# Patient Record
Sex: FEMALE | URBAN - METROPOLITAN AREA
[De-identification: names, ages, dates, MRNs, and addresses within clinical notes are randomized per-mention and may not be internally consistent; named-entity substitution may affect disease eponyms.]

---

## 2024-10-29 PROCEDURE — RXMED WILLOW AMBULATORY MEDICATION CHARGE

## 2024-11-05 ENCOUNTER — PHARMACY VISIT (OUTPATIENT)
Dept: PHARMACY | Facility: CLINIC | Age: 19
End: 2024-11-05
Payer: MEDICARE

## 2024-11-08 ENCOUNTER — HOSPITAL ENCOUNTER (EMERGENCY)
Facility: HOSPITAL | Age: 19
Discharge: HOME | End: 2024-11-08

## 2024-11-08 VITALS
RESPIRATION RATE: 18 BRPM | HEART RATE: 86 BPM | OXYGEN SATURATION: 95 % | BODY MASS INDEX: 20.62 KG/M2 | DIASTOLIC BLOOD PRESSURE: 79 MMHG | WEIGHT: 105 LBS | SYSTOLIC BLOOD PRESSURE: 121 MMHG | TEMPERATURE: 96.8 F | HEIGHT: 60 IN

## 2024-11-08 PROCEDURE — 4500999001 HC ED NO CHARGE

## 2024-11-08 ASSESSMENT — LIFESTYLE VARIABLES
TOTAL SCORE: 0
HAVE YOU EVER FELT YOU SHOULD CUT DOWN ON YOUR DRINKING: NO
HAVE PEOPLE ANNOYED YOU BY CRITICIZING YOUR DRINKING: NO
EVER HAD A DRINK FIRST THING IN THE MORNING TO STEADY YOUR NERVES TO GET RID OF A HANGOVER: NO
EVER FELT BAD OR GUILTY ABOUT YOUR DRINKING: NO

## 2024-11-08 ASSESSMENT — PAIN DESCRIPTION - LOCATION: LOCATION: BACK

## 2024-11-08 ASSESSMENT — COLUMBIA-SUICIDE SEVERITY RATING SCALE - C-SSRS
2. HAVE YOU ACTUALLY HAD ANY THOUGHTS OF KILLING YOURSELF?: NO
1. IN THE PAST MONTH, HAVE YOU WISHED YOU WERE DEAD OR WISHED YOU COULD GO TO SLEEP AND NOT WAKE UP?: NO
6. HAVE YOU EVER DONE ANYTHING, STARTED TO DO ANYTHING, OR PREPARED TO DO ANYTHING TO END YOUR LIFE?: NO

## 2024-11-08 ASSESSMENT — PAIN DESCRIPTION - PROGRESSION: CLINICAL_PROGRESSION: NOT CHANGED

## 2024-11-08 ASSESSMENT — PAIN - FUNCTIONAL ASSESSMENT: PAIN_FUNCTIONAL_ASSESSMENT: 0-10

## 2024-11-08 ASSESSMENT — PAIN SCALES - GENERAL: PAINLEVEL_OUTOF10: 9

## 2024-11-08 NOTE — ED TRIAGE NOTES
Enters ED reporting mid-back pain that began today after a massage rated 9/10. Unrelieved by  mg Motrin. Denies neuropathy. Steady gait.

## 2024-12-13 ENCOUNTER — PHARMACY VISIT (OUTPATIENT)
Dept: PHARMACY | Facility: CLINIC | Age: 19
End: 2024-12-13
Payer: MEDICARE

## 2024-12-13 PROCEDURE — RXMED WILLOW AMBULATORY MEDICATION CHARGE

## 2025-04-01 PROCEDURE — RXMED WILLOW AMBULATORY MEDICATION CHARGE

## 2025-04-04 ENCOUNTER — PHARMACY VISIT (OUTPATIENT)
Dept: PHARMACY | Facility: CLINIC | Age: 20
End: 2025-04-04
Payer: MEDICARE

## 2025-04-29 PROCEDURE — RXMED WILLOW AMBULATORY MEDICATION CHARGE

## 2025-05-03 ENCOUNTER — PHARMACY VISIT (OUTPATIENT)
Dept: PHARMACY | Facility: CLINIC | Age: 20
End: 2025-05-03
Payer: MEDICARE

## 2025-05-05 ENCOUNTER — APPOINTMENT (OUTPATIENT)
Dept: RADIOLOGY | Facility: HOSPITAL | Age: 20
End: 2025-05-05
Payer: COMMERCIAL

## 2025-05-05 ENCOUNTER — HOSPITAL ENCOUNTER (EMERGENCY)
Facility: HOSPITAL | Age: 20
Discharge: HOME | End: 2025-05-06
Attending: EMERGENCY MEDICINE
Payer: COMMERCIAL

## 2025-05-05 VITALS
BODY MASS INDEX: 20.62 KG/M2 | WEIGHT: 105 LBS | OXYGEN SATURATION: 98 % | HEART RATE: 60 BPM | SYSTOLIC BLOOD PRESSURE: 115 MMHG | TEMPERATURE: 98.2 F | HEIGHT: 60 IN | RESPIRATION RATE: 16 BRPM | DIASTOLIC BLOOD PRESSURE: 75 MMHG

## 2025-05-05 DIAGNOSIS — R10.31 RIGHT LOWER QUADRANT ABDOMINAL PAIN: Primary | ICD-10-CM

## 2025-05-05 LAB
ABO GROUP (TYPE) IN BLOOD: NORMAL
ALBUMIN SERPL BCP-MCNC: 4.2 G/DL (ref 3.4–5)
ALP SERPL-CCNC: 59 U/L (ref 33–110)
ALT SERPL W P-5'-P-CCNC: 13 U/L (ref 7–45)
ANION GAP SERPL CALC-SCNC: 13 MMOL/L (ref 10–20)
ANTIBODY SCREEN: NORMAL
APPEARANCE UR: CLEAR
APTT PPP: 37 SECONDS (ref 26–36)
AST SERPL W P-5'-P-CCNC: 17 U/L (ref 9–39)
B-HCG SERPL-ACNC: <3 MIU/ML
BASOPHILS # BLD AUTO: 0.05 X10*3/UL (ref 0–0.1)
BASOPHILS NFR BLD AUTO: 0.6 %
BILIRUB SERPL-MCNC: 0.3 MG/DL (ref 0–1.2)
BILIRUB UR STRIP.AUTO-MCNC: NEGATIVE MG/DL
BUN SERPL-MCNC: 13 MG/DL (ref 6–23)
CALCIUM SERPL-MCNC: 9.5 MG/DL (ref 8.6–10.6)
CHLORIDE SERPL-SCNC: 99 MMOL/L (ref 98–107)
CLUE CELLS SPEC QL WET PREP: NORMAL
CO2 SERPL-SCNC: 28 MMOL/L (ref 21–32)
COLOR UR: NORMAL
CREAT SERPL-MCNC: 0.55 MG/DL (ref 0.5–1.05)
EGFRCR SERPLBLD CKD-EPI 2021: >90 ML/MIN/1.73M*2
EOSINOPHIL # BLD AUTO: 0.17 X10*3/UL (ref 0–0.7)
EOSINOPHIL NFR BLD AUTO: 1.9 %
ERYTHROCYTE [DISTWIDTH] IN BLOOD BY AUTOMATED COUNT: 11.2 % (ref 11.5–14.5)
GLUCOSE SERPL-MCNC: 115 MG/DL (ref 74–99)
GLUCOSE UR STRIP.AUTO-MCNC: NORMAL MG/DL
HCT VFR BLD AUTO: 40.7 % (ref 36–46)
HGB BLD-MCNC: 13.9 G/DL (ref 12–16)
IMM GRANULOCYTES # BLD AUTO: 0.11 X10*3/UL (ref 0–0.7)
IMM GRANULOCYTES NFR BLD AUTO: 1.2 % (ref 0–0.9)
INR PPP: 0.9 (ref 0.9–1.1)
KETONES UR STRIP.AUTO-MCNC: NEGATIVE MG/DL
LACTATE SERPL-SCNC: 1.5 MMOL/L (ref 0.4–2)
LEUKOCYTE ESTERASE UR QL STRIP.AUTO: NEGATIVE
LIPASE SERPL-CCNC: 39 U/L (ref 9–82)
LYMPHOCYTES # BLD AUTO: 1.9 X10*3/UL (ref 1.2–4.8)
LYMPHOCYTES NFR BLD AUTO: 20.9 %
MCH RBC QN AUTO: 28.6 PG (ref 26–34)
MCHC RBC AUTO-ENTMCNC: 34.2 G/DL (ref 32–36)
MCV RBC AUTO: 84 FL (ref 80–100)
MONOCYTES # BLD AUTO: 0.46 X10*3/UL (ref 0.1–1)
MONOCYTES NFR BLD AUTO: 5.1 %
MUCOUS THREADS #/AREA URNS AUTO: NORMAL /LPF
NEUTROPHILS # BLD AUTO: 6.39 X10*3/UL (ref 1.2–7.7)
NEUTROPHILS NFR BLD AUTO: 70.3 %
NITRITE UR QL STRIP.AUTO: NEGATIVE
NRBC BLD-RTO: 0 /100 WBCS (ref 0–0)
PH UR STRIP.AUTO: 6.5 [PH]
PLATELET # BLD AUTO: 366 X10*3/UL (ref 150–450)
POTASSIUM SERPL-SCNC: 4 MMOL/L (ref 3.5–5.3)
PROT SERPL-MCNC: 8.1 G/DL (ref 6.4–8.2)
PROT UR STRIP.AUTO-MCNC: NORMAL MG/DL
PROTHROMBIN TIME: 9.9 SECONDS (ref 9.8–12.4)
RBC # BLD AUTO: 4.86 X10*6/UL (ref 4–5.2)
RBC # UR STRIP.AUTO: NEGATIVE MG/DL
RBC #/AREA URNS AUTO: NORMAL /HPF
RH FACTOR (ANTIGEN D): NORMAL
SODIUM SERPL-SCNC: 136 MMOL/L (ref 136–145)
SP GR UR STRIP.AUTO: 1.03
SQUAMOUS #/AREA URNS AUTO: NORMAL /HPF
T VAGINALIS SPEC QL WET PREP: NORMAL
TRICHOMONAS REFLEX COMMENT: NORMAL
UROBILINOGEN UR STRIP.AUTO-MCNC: NORMAL MG/DL
WBC # BLD AUTO: 9.1 X10*3/UL (ref 4.4–11.3)
WBC #/AREA URNS AUTO: NORMAL /HPF
WBC VAG QL WET PREP: NORMAL
YEAST VAG QL WET PREP: NORMAL

## 2025-05-05 PROCEDURE — 76830 TRANSVAGINAL US NON-OB: CPT | Performed by: RADIOLOGY

## 2025-05-05 PROCEDURE — 86901 BLOOD TYPING SEROLOGIC RH(D): CPT | Performed by: PHYSICIAN ASSISTANT

## 2025-05-05 PROCEDURE — 87491 CHLMYD TRACH DNA AMP PROBE: CPT | Performed by: PHYSICIAN ASSISTANT

## 2025-05-05 PROCEDURE — 2500000004 HC RX 250 GENERAL PHARMACY W/ HCPCS (ALT 636 FOR OP/ED): Mod: JZ | Performed by: PHYSICIAN ASSISTANT

## 2025-05-05 PROCEDURE — 76856 US EXAM PELVIC COMPLETE: CPT

## 2025-05-05 PROCEDURE — 80053 COMPREHEN METABOLIC PANEL: CPT | Performed by: EMERGENCY MEDICINE

## 2025-05-05 PROCEDURE — 99285 EMERGENCY DEPT VISIT HI MDM: CPT | Mod: 25 | Performed by: EMERGENCY MEDICINE

## 2025-05-05 PROCEDURE — 74177 CT ABD & PELVIS W/CONTRAST: CPT

## 2025-05-05 PROCEDURE — 81001 URINALYSIS AUTO W/SCOPE: CPT | Performed by: EMERGENCY MEDICINE

## 2025-05-05 PROCEDURE — 85730 THROMBOPLASTIN TIME PARTIAL: CPT | Performed by: PHYSICIAN ASSISTANT

## 2025-05-05 PROCEDURE — 96361 HYDRATE IV INFUSION ADD-ON: CPT

## 2025-05-05 PROCEDURE — 83690 ASSAY OF LIPASE: CPT | Performed by: EMERGENCY MEDICINE

## 2025-05-05 PROCEDURE — 2500000004 HC RX 250 GENERAL PHARMACY W/ HCPCS (ALT 636 FOR OP/ED): Mod: JZ

## 2025-05-05 PROCEDURE — 87210 SMEAR WET MOUNT SALINE/INK: CPT | Performed by: PHYSICIAN ASSISTANT

## 2025-05-05 PROCEDURE — 85025 COMPLETE CBC W/AUTO DIFF WBC: CPT | Performed by: EMERGENCY MEDICINE

## 2025-05-05 PROCEDURE — 74177 CT ABD & PELVIS W/CONTRAST: CPT | Mod: FOREIGN READ | Performed by: RADIOLOGY

## 2025-05-05 PROCEDURE — 83605 ASSAY OF LACTIC ACID: CPT | Performed by: PHYSICIAN ASSISTANT

## 2025-05-05 PROCEDURE — 96375 TX/PRO/DX INJ NEW DRUG ADDON: CPT

## 2025-05-05 PROCEDURE — 93975 VASCULAR STUDY: CPT

## 2025-05-05 PROCEDURE — 84702 CHORIONIC GONADOTROPIN TEST: CPT | Performed by: EMERGENCY MEDICINE

## 2025-05-05 PROCEDURE — 76856 US EXAM PELVIC COMPLETE: CPT | Performed by: RADIOLOGY

## 2025-05-05 PROCEDURE — 36415 COLL VENOUS BLD VENIPUNCTURE: CPT | Performed by: PHYSICIAN ASSISTANT

## 2025-05-05 PROCEDURE — 2550000001 HC RX 255 CONTRASTS: Performed by: EMERGENCY MEDICINE

## 2025-05-05 PROCEDURE — 96374 THER/PROPH/DIAG INJ IV PUSH: CPT | Mod: 59

## 2025-05-05 PROCEDURE — 87661 TRICHOMONAS VAGINALIS AMPLIF: CPT | Performed by: PHYSICIAN ASSISTANT

## 2025-05-05 RX ORDER — MORPHINE SULFATE 4 MG/ML
INJECTION INTRAVENOUS
Status: COMPLETED
Start: 2025-05-05 | End: 2025-05-05

## 2025-05-05 RX ORDER — ONDANSETRON HYDROCHLORIDE 2 MG/ML
INJECTION, SOLUTION INTRAVENOUS
Status: COMPLETED
Start: 2025-05-05 | End: 2025-05-05

## 2025-05-05 RX ORDER — MORPHINE SULFATE 4 MG/ML
4 INJECTION INTRAVENOUS ONCE
Status: COMPLETED | OUTPATIENT
Start: 2025-05-05 | End: 2025-05-05

## 2025-05-05 RX ORDER — ONDANSETRON HYDROCHLORIDE 2 MG/ML
4 INJECTION, SOLUTION INTRAVENOUS ONCE
Status: COMPLETED | OUTPATIENT
Start: 2025-05-05 | End: 2025-05-05

## 2025-05-05 RX ADMIN — MORPHINE SULFATE 4 MG: 4 INJECTION, SOLUTION INTRAMUSCULAR; INTRAVENOUS at 19:08

## 2025-05-05 RX ADMIN — MORPHINE SULFATE 4 MG: 4 INJECTION INTRAVENOUS at 19:08

## 2025-05-05 RX ADMIN — SODIUM CHLORIDE, SODIUM LACTATE, POTASSIUM CHLORIDE, AND CALCIUM CHLORIDE 1000 ML: .6; .31; .03; .02 INJECTION, SOLUTION INTRAVENOUS at 19:09

## 2025-05-05 RX ADMIN — IOHEXOL 75 ML: 350 INJECTION, SOLUTION INTRAVENOUS at 20:15

## 2025-05-05 RX ADMIN — ONDANSETRON 4 MG: 2 INJECTION INTRAMUSCULAR; INTRAVENOUS at 19:09

## 2025-05-05 RX ADMIN — ONDANSETRON HYDROCHLORIDE 4 MG: 2 INJECTION, SOLUTION INTRAVENOUS at 19:09

## 2025-05-05 ASSESSMENT — PAIN SCALES - GENERAL: PAINLEVEL_OUTOF10: 0 - NO PAIN

## 2025-05-05 ASSESSMENT — PAIN DESCRIPTION - LOCATION: LOCATION: ABDOMEN

## 2025-05-05 ASSESSMENT — PAIN DESCRIPTION - DESCRIPTORS: DESCRIPTORS: STABBING;CRAMPING

## 2025-05-05 ASSESSMENT — PAIN - FUNCTIONAL ASSESSMENT: PAIN_FUNCTIONAL_ASSESSMENT: 0-10

## 2025-05-05 NOTE — ED TRIAGE NOTES
Pt presents with abdominal pain since 1630 accompanied by lower back pain with nausea. Pt is on birth control .

## 2025-05-05 NOTE — ED PROVIDER NOTES
Emergency Department Encounter  [facility] EMERGENCY MEDICINE    Patient: Vamshi Adams  MRN: [id number]  : 2005  Date of Evaluation: 2025  ED Provider: Lorenza Waite PA-C        History of Present Illness     This is a 19-year-old healthy female who presents to the ED with sudden onset right lower quadrant abdominal pain that began around 430 this afternoon.  She states the pain is primarily in her right lower quadrant but is radiating throughout her abdomen.  Denies is ever happening previously.  Endorses associated nausea without vomiting.  Denies vaginal discharge or bleeding.  Denies urinary symptoms.  Denies constipation or diarrhea.  Denies is ever happening previously.  States otherwise she has been feeling well.               Visit Vitals  /89   Pulse 69   Temp 36.8 °C (98.2 °F) (Temporal)   Resp 17   Ht 1.524 m (5')   Wt 47.6 kg (105 lb)   SpO2 99%   BMI 20.51 kg/m²   Smoking Status Unknown   BSA 1.42 m²          Physical Exam       Triage vitals:  T 36.8 °C (98.2 °F)  HR 69  /89  RR 17  O2 99 % None (Room air)    Physical Exam     Physical exam:    General: Vitals noted, no distress. Afebrile.    EENT: PERRL, EOM's intact. Eye lids without lesions. No scleral icterus. Normal phonation. Nares patent. MMM.     Cardiac: Regular, rate, rhythm, no murmur.    Pulmonary: Lungs clear bilaterally with good aeration. No adventitious breath sounds.    Abdomen: Exquisitely tender to palpation to the right lower quadrant at McBurney's point.  Voluntary guarding of the area.  Positive Rovsing sign.  Mild tenderness palpation throughout the lower abdomen diffusely.  Soft, nonsurgical. No peritoneal signs. Normoactive bowel sounds. No CVA tenderness.     Pelvic Exam: Chaperone present. External genitalia normal. No rashes or lesions. Speculum exam shows no lesions on the cervix. No abnormal discharge. No bleeding. Closed cervical os. Bimanual exam shows right sided  adnexal pain without any palpable mass.  No cervical motion tenderness.    Extremities: No peripheral edema. Full range of motion. Moves all extremities freely.     Skin: No rash. Warm and dry. No discoloration noted.     Neuro: No focal neurologic deficits noted.  Pt is A&O x3, speech is clear, moves all extremities independently sensation is intact.          Results       Labs Reviewed   CBC WITH AUTO DIFFERENTIAL - Abnormal       Result Value    WBC 9.1      nRBC 0.0      RBC 4.86      Hemoglobin 13.9      Hematocrit 40.7      MCV 84      MCH 28.6      MCHC 34.2      RDW 11.2 (*)     Platelets 366      Neutrophils % 70.3      Immature Granulocytes %, Automated 1.2 (*)     Lymphocytes % 20.9      Monocytes % 5.1      Eosinophils % 1.9      Basophils % 0.6      Neutrophils Absolute 6.39      Immature Granulocytes Absolute, Automated 0.11      Lymphocytes Absolute 1.90      Monocytes Absolute 0.46      Eosinophils Absolute 0.17      Basophils Absolute 0.05     COMPREHENSIVE METABOLIC PANEL - Abnormal    Glucose 115 (*)     Sodium 136      Potassium 4.0      Chloride 99      Bicarbonate 28      Anion Gap 13      Urea Nitrogen 13      Creatinine 0.55      eGFR >90      Calcium 9.5      Albumin 4.2      Alkaline Phosphatase 59      Total Protein 8.1      AST 17      Bilirubin, Total 0.3      ALT 13     COAGULATION SCREEN - Abnormal    Protime 9.9      INR 0.9      aPTT 37 (*)     Narrative:     The APTT is no longer used for monitoring Unfractionated Heparin Therapy. For monitoring Heparin Therapy, use the Heparin Assay.   HUMAN CHORIONIC GONADOTROPIN, SERUM QUANTITATIVE - Normal    HCG, Beta-Quantitative <3      Narrative:     Total HCG measurement is performed using the Siemens [employer] immunoassay which detects intact HCG and free beta HCG subunit.  This test is not indicated for use as a tumor marker.  HCG testing is performed using a different test methodology at [facility] than other system  . Direct result comparison  should only be made within the same method.          LIPASE - Normal    Lipase 39      Narrative:     Venipuncture immediately after or during the administration of Metamizole may lead to falsely low results. Testing should be performed immediately prior to Metamizole dosing.   URINALYSIS WITH REFLEX CULTURE AND MICROSCOPIC - Normal    Color, Urine Light-Yellow      Appearance, Urine Clear      Specific Gravity, Urine 1.031      pH, Urine 6.5      Protein, Urine 10 (TRACE)      Glucose, Urine Normal      Blood, Urine NEGATIVE      Ketones, Urine NEGATIVE      Bilirubin, Urine NEGATIVE      Urobilinogen, Urine Normal      Nitrite, Urine NEGATIVE      Leukocyte Esterase, Urine NEGATIVE     LACTATE - Normal    Lactate 1.5      Narrative:     Venipuncture immediately after or during the administration of Metamizole may lead to falsely low results. Testing should be performed immediately prior to Metamizole dosing.   TRICHOMONAS WET PREP REFLEX TO PCR    Trichomonas None Seen      Clue Cells None Seen      Yeast None Seen      WBC 1-2      Trichomonas reflex comment        Value: Trichomonas was not seen by wet prep. Reflex Trichomonas vaginalis by Amplified Detection.   TYPE AND SCREEN    ABO TYPE B      Rh TYPE POS      ANTIBODY SCREEN NEG     URINALYSIS WITH REFLEX CULTURE AND MICROSCOPIC    Narrative:     The following orders were created for panel order Urinalysis with Reflex Culture and Microscopic.  Procedure                               Abnormality         Status                     ---------                               -----------         ------                     Urinalysis with Reflex C...[[id number]]  Normal              Final result               Extra Urine Gray Tube[[id number]]                            In process                   Please view results for these tests on the individual orders.   EXTRA URINE GRAY TUBE   C. TRACHOMATIS / N. GONORRHOEAE, AMPLIFIED,  UROGENITAL   TRICH VAGINALIS, AMPLIFIED   URINALYSIS MICROSCOPIC WITH REFLEX CULTURE    WBC, Urine 1-5      RBC, Urine 1-2      Squamous Epithelial Cells, Urine 1-9 (SPARSE)      Mucus, Urine 2+         US PELVIS TRANSABDOMINAL WITH TRANSVAGINAL   Final Result   Trace amount of free fluid at the right adnexa. Otherwise, no acute   findings.                       I personally reviewed the images/study and I agree with the findings   as stated.        MACRO:   None        Signed by: Hazel Bullock 5/5/2025 8:57 PM   Dictation workstation:   [id number]      CT abdomen pelvis w IV contrast    (Results Pending)         Medical Decision Making & ED Course         ED Course & MDM     Medical Decision Making  This is a 19-year-old healthy female who presents to the ED with sudden onset right lower quadrant abdominal pain with associated nausea that began around 430 this afternoon.  Vital stable upon arrival to the ED.  On examination she was exquisitely tender to palpation to her right lower quadrant.  Voluntary guarding in the area.  Positive Rovsing sign.  Pelvic examination performed which did show right-sided adnexal tenderness without palpable mass.  No cervical motion tenderness.  Laboratory studies obtained.  Transvaginal to sound ordered to evaluate for torsion as well as CT abdomen and pelvis.  Patient medicated with Zofran, morphine and 1 L of IV fluids.  She was discussed with the attending physician.  On reassessment she was feeling improved.  Laboratory studies overall grossly unremarkable.  Normal wet prep.  Normal UA.  Ultrasound showed no evidence of ovarian torsion.  There was a trace amount of free fluid in the right adnexal area.  Patient signed out to oncoming team pending CT abdomen pelvis and reassessment.                         Disposition   Patient was signed out to Tabitha Luis at 2200 pending completion of their work-up.  Please see the next provider's transition of care note for the  remainder of the patient's care.     Procedures     This was a shared visit with an ED attending.  The patient was seen and discussed with the ED attending    Procedures    Lorenza Waite PA-C  Emergency Medicine      Lorenza Waite PA-C  05/05/25 [id number]

## 2025-05-05 NOTE — Clinical Note
Vamshi Adams was seen and treated in our emergency department on 5/5/2025.  She may return to school on 05/08/2025.      If you have any questions or concerns, please don't hesitate to call.      Steffen Simerlink, MD

## 2025-05-06 ENCOUNTER — PHARMACY VISIT (OUTPATIENT)
Dept: PHARMACY | Facility: CLINIC | Age: 20
End: 2025-05-06
Payer: MEDICARE

## 2025-05-06 LAB
C TRACH RRNA SPEC QL NAA+PROBE: NEGATIVE
HOLD SPECIMEN: NORMAL
N GONORRHOEA DNA SPEC QL PROBE+SIG AMP: NEGATIVE
T VAGINALIS RRNA SPEC QL NAA+PROBE: NEGATIVE

## 2025-05-06 PROCEDURE — 2500000001 HC RX 250 WO HCPCS SELF ADMINISTERED DRUGS (ALT 637 FOR MEDICARE OP): Performed by: PHYSICIAN ASSISTANT

## 2025-05-06 PROCEDURE — RXMED WILLOW AMBULATORY MEDICATION CHARGE

## 2025-05-06 PROCEDURE — 2500000004 HC RX 250 GENERAL PHARMACY W/ HCPCS (ALT 636 FOR OP/ED): Performed by: PHYSICIAN ASSISTANT

## 2025-05-06 RX ORDER — OXYCODONE HYDROCHLORIDE 5 MG/1
5 TABLET ORAL EVERY 6 HOURS PRN
Qty: 8 TABLET | Refills: 0 | Status: SHIPPED | OUTPATIENT
Start: 2025-05-06 | End: 2025-05-08

## 2025-05-06 RX ORDER — ONDANSETRON 4 MG/1
4 TABLET, ORALLY DISINTEGRATING ORAL EVERY 8 HOURS PRN
Qty: 21 TABLET | Refills: 0 | Status: SHIPPED | OUTPATIENT
Start: 2025-05-06 | End: 2025-05-13

## 2025-05-06 RX ORDER — ONDANSETRON 4 MG/1
4 TABLET, ORALLY DISINTEGRATING ORAL ONCE AS NEEDED
Status: COMPLETED | OUTPATIENT
Start: 2025-05-06 | End: 2025-05-06

## 2025-05-06 RX ORDER — OXYCODONE HYDROCHLORIDE 5 MG/1
5 TABLET ORAL ONCE
Refills: 0 | Status: COMPLETED | OUTPATIENT
Start: 2025-05-06 | End: 2025-05-06

## 2025-05-06 RX ADMIN — OXYCODONE 5 MG: 5 TABLET ORAL at 02:09

## 2025-05-06 RX ADMIN — ONDANSETRON 4 MG: 4 TABLET, ORALLY DISINTEGRATING ORAL at 02:09

## 2025-05-06 ASSESSMENT — ENCOUNTER SYMPTOMS
CHEST TIGHTNESS: 0
NAUSEA: 0
WEAKNESS: 0
ABDOMINAL PAIN: 0
FEVER: 0
CHILLS: 0
APPETITE CHANGE: 0
HEMATURIA: 0
VOMITING: 0
DYSURIA: 0
LIGHT-HEADEDNESS: 0
CONSTIPATION: 0
DIARRHEA: 0
DIZZINESS: 0

## 2025-05-06 NOTE — CONSULTS
[facility]  ACUTE CARE SURGERY - HISTORY AND PHYSICAL / CONSULT    Patient Name: Vamshi Adams  MRN: [id number]  Admit Date: 505  : 2005  AGE: 19 y.o.   GENDER: female  ==============================================================================  TODAY'S ASSESSMENT AND PLAN OF CARE:  Vamshi Adams is a 19 y.o. 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 CT with distended loops of bowel but no obvious transition point. She is tolerating PO intake, has no nausea or emesis and is passing gas and having bowel movements. Making obstruction less likely.    Plan:  - recommend admission for 23 hr obs give abdominal pain  - if patient does not want to be admitted for obs, PO challenge and trail of ambulation prior to discharge  - if discharges, please discharge with return precautions in the event of recurrent abdominal pain    Patient discussed with Chief Resident Dr. Antonio Gtz, PGY-4 and Attending Physician Dr. Moore.    Umm Mcgraw MD PGY-2  Acute Care Surgery [id number]    ==============================================================================  CHIEF COMPLAINT/REASON FOR CONSULT:  Vamshi Adams is a 19 y.o. 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 Pain was sudden, sharp and located in the RLQ.  It has been constant.  She has never has anything similar to this previously.  No associated nausea, vomiting, fever, chills or chest pain.  She is passing gas and having bowel movements.  Since she was a child she has have some episodes where she feels bloated, has an episode of emesis and then feels better but this feels different than that.  She is not currently menstruating,  this pain feels different than her normal menstrual cramps.    In the emergency department she is afebrile, nontachycardic, normotensive, saturating well on room air.  No significant lab abnormalities.  CT of the abdomen and pelvis demonstrating distended small bowel loops with air fluid level and fecalization without evidence of transition point.  Ultrasound of the pelvis with small amount of free fluid in her right adnexa, bilateral fluid to the ovaries.  General surgery consulted for additional conditions.    PMH:   Medical History[1]  Last menstrual period: on birth control    PSH:   Surgical History[2]  FH:   Family History[3]  SOCIAL HISTORY:    Smoking:    Tobacco Use History[4]    Alcohol:    Social History     Substance and Sexual Activity   Alcohol Use None       Drug use: denies    MEDICATIONS:   Prior to Admission medications    Medication Sig Start Date End Date Taking? Authorizing Provider   drospirenone-ethinyl estradiol (Marisabel) 3-0.02 mg tablet Take 1 tablet by mouth daily 3/31/25      drospirenone-ethinyl estradiol (Marisabel) 3-0.02 mg tablet Take 1 tablet by mouth daily 4/29/25      drospirenone-ethinyl estradioL (Marisabel, Gianvi) 3-0.02 mg tablet Take 1 tablet by mouth daily 10/29/24      spironolactone (Aldactone) 25 mg tablet Take 75mg (three 25mg tablets) daily. Stop if pregnant. 10/29/24        ALLERGIES:   RX Allergies[5]    REVIEW OF SYSTEMS:  Review of Systems   Constitutional:  Negative for appetite change, chills and fever.   Respiratory:  Negative for chest tightness.    Cardiovascular:  Negative for chest pain.   Gastrointestinal:  Negative for abdominal pain, constipation, diarrhea, nausea and vomiting.   Genitourinary:  Negative for dysuria, hematuria and menstrual problem.   Neurological:  Negative for dizziness, weakness and light-headedness.     PHYSICAL EXAM:  Physical Exam  Constitutional:       General: She is not in acute distress.     Appearance: Normal appearance.   HENT:       Mouth/Throat:      Mouth: Mucous membranes are moist.      Pharynx: Oropharynx is clear.   Eyes:      General: No scleral icterus.     Conjunctiva/sclera: Conjunctivae normal.   Cardiovascular:      Rate and Rhythm: Normal rate and regular rhythm.   Pulmonary:      Effort: No respiratory distress.   Chest:      Chest wall: No tenderness.   Abdominal:      General: There is distension.      Tenderness: There is abdominal tenderness (mildly tender in the left abdomen). There is no guarding or rebound.      Comments: Well healed RUQ scar   Skin:     General: Skin is warm and dry.      Capillary Refill: Capillary refill takes less than 2 seconds.   Neurological:      General: No focal deficit present.      Mental Status: She is alert.   Psychiatric:         Mood and Affect: Mood normal.       IMAGING SUMMARY:    US PELVIS TRANSABDOMINAL WITH TRANSVAGINAL   Final Result   Trace amount of free fluid at the right adnexa. Otherwise, no acute   findings.                       I personally reviewed the images/study and I agree with the findings   as stated.        MACRO:   None        Signed by: Hazel Bullock 5/5/2025 8:57 PM   Dictation workstation:   [id number]      CT abdomen pelvis w IV contrast   Final Result   1. Mild wall thickening of multiple small bowel loops as well as   possible scattered colonic wall thickening, suggesting underlying   enterocolitis which may be infectious or inflammatory in etiology.   Additionally, multiple prominent caliber small bowel loops demonstrate   air-fluid levels and fecalization of intraluminal contents, raising   the possibility of a developing small bowel obstruction possibly due   to inflammatory stricture. Although, no discrete transition zone is   identified.   Signed by Edwin Bradford DO             LABS:  Results from last 7 days   Lab Units 05/05/25  1903   WBC AUTO x10*3/uL 9.1   HEMOGLOBIN g/dL 13.9   HEMATOCRIT % 40.7   PLATELETS AUTO x10*3/uL 366   NEUTROS PCT AUTO %  70.3   LYMPHS PCT AUTO % 20.9   MONOS PCT AUTO % 5.1   EOS PCT AUTO % 1.9     Results from last 7 days   Lab Units 05/05/25  1903   APTT seconds 37*   INR  0.9     Results from last 7 days   Lab Units 05/05/25  1903   SODIUM mmol/L 136   POTASSIUM mmol/L 4.0   CHLORIDE mmol/L 99   CO2 mmol/L 28   BUN mg/dL 13   CREATININE mg/dL 0.55   CALCIUM mg/dL 9.5   PROTEIN TOTAL g/dL 8.1   BILIRUBIN TOTAL mg/dL 0.3   ALK PHOS U/L 59   ALT U/L 13   AST U/L 17   GLUCOSE mg/dL 115*     Results from last 7 days   Lab Units 05/05/25  1903   BILIRUBIN TOTAL mg/dL 0.3             I have reviewed all laboratory and imaging results ordered/pertinent for this encounter.        [1] No past medical history on file.  [2] No past surgical history on file.  [3] No family history on file.  [4]   Social History  Tobacco Use   Smoking Status Unknown   Smokeless Tobacco Not on file   [5] No Known Allergies

## 2025-05-06 NOTE — DISCHARGE INSTRUCTIONS
medications at [facility] pharmacy here at [facility] - opens at 8AM  Follow up with your PCP and GI physician at home when you return Friday.

## 2025-05-06 NOTE — PROGRESS NOTES
Emergency Medicine Transition of Care Note.    I received Vamshi Adams in signout from ROX Waite.  Please see the previous ED provider note for all HPI, PE and MDM up to the time of signout at 2200. This is in addition to the primary record.    In brief Vamshi Adams is an 19 y.o. female presenting for   Chief Complaint   Patient presents with    Abdominal Pain     At the time of signout we were awaiting: CT final read    In brief, pt with h/o jejunal atresia s/p surgery as a . Presenting with RLQ pain, nausea, LBP since 1630. Labs benign. US without acute findings. CT c/f enterocolitis, possible developing SBO v. Inflammatory stricture.  ACS is consulted given h/o surgery as infant with questionable findings on CT. Pt resides in [place], here for school, plans to return home Friday if medically cleared and able - follows with GI in [place].  ACS recommends either obs for pain control v. PO chall and dispo home. Pt willing to attempt PO chall, given oral pain medication.   Able to tolerate oral intake. Agreeable with discharge plan - medications sent to [facility], can pick them up in AM  Staffed with supervising physician, Dr. Nettles, who agrees with workup and treatment plan.    Diagnostics     Labs Reviewed   CBC WITH AUTO DIFFERENTIAL - Abnormal       Result Value    WBC 9.1      nRBC 0.0      RBC 4.86      Hemoglobin 13.9      Hematocrit 40.7      MCV 84      MCH 28.6      MCHC 34.2      RDW 11.2 (*)     Platelets 366      Neutrophils % 70.3      Immature Granulocytes %, Automated 1.2 (*)     Lymphocytes % 20.9      Monocytes % 5.1      Eosinophils % 1.9      Basophils % 0.6      Neutrophils Absolute 6.39      Immature Granulocytes Absolute, Automated 0.11      Lymphocytes Absolute 1.90      Monocytes Absolute 0.46      Eosinophils Absolute 0.17      Basophils Absolute 0.05     COMPREHENSIVE METABOLIC PANEL - Abnormal    Glucose 115 (*)     Sodium 136      Potassium 4.0      Chloride 99       Bicarbonate 28      Anion Gap 13      Urea Nitrogen 13      Creatinine 0.55      eGFR >90      Calcium 9.5      Albumin 4.2      Alkaline Phosphatase 59      Total Protein 8.1      AST 17      Bilirubin, Total 0.3      ALT 13     COAGULATION SCREEN - Abnormal    Protime 9.9      INR 0.9      aPTT 37 (*)     Narrative:     The APTT is no longer used for monitoring Unfractionated Heparin Therapy. For monitoring Heparin Therapy, use the Heparin Assay.   HUMAN CHORIONIC GONADOTROPIN, SERUM QUANTITATIVE - Normal    HCG, Beta-Quantitative <3      Narrative:     Total HCG measurement is performed using the Siemens [employer] immunoassay which detects intact HCG and free beta HCG subunit.  This test is not indicated for use as a tumor marker.  HCG testing is performed using a different test methodology at [facility] than other [facility]. Direct result comparison  should only be made within the same method.          LIPASE - Normal    Lipase 39      Narrative:     Venipuncture immediately after or during the administration of Metamizole may lead to falsely low results. Testing should be performed immediately prior to Metamizole dosing.   URINALYSIS WITH REFLEX CULTURE AND MICROSCOPIC - Normal    Color, Urine Light-Yellow      Appearance, Urine Clear      Specific Gravity, Urine 1.031      pH, Urine 6.5      Protein, Urine 10 (TRACE)      Glucose, Urine Normal      Blood, Urine NEGATIVE      Ketones, Urine NEGATIVE      Bilirubin, Urine NEGATIVE      Urobilinogen, Urine Normal      Nitrite, Urine NEGATIVE      Leukocyte Esterase, Urine NEGATIVE     LACTATE - Normal    Lactate 1.5      Narrative:     Venipuncture immediately after or during the administration of Metamizole may lead to falsely low results. Testing should be performed immediately prior to Metamizole dosing.   TRICHOMONAS WET PREP REFLEX TO PCR    Trichomonas None Seen      Clue Cells None Seen      Yeast None Seen      WBC 1-2       Trichomonas reflex comment        Value: Trichomonas was not seen by wet prep. Reflex Trichomonas vaginalis by Amplified Detection.   TYPE AND SCREEN    ABO TYPE B      Rh TYPE POS      ANTIBODY SCREEN NEG     URINALYSIS WITH REFLEX CULTURE AND MICROSCOPIC    Narrative:     The following orders were created for panel order Urinalysis with Reflex Culture and Microscopic.  Procedure                               Abnormality         Status                     ---------                               -----------         ------                     Urinalysis with Reflex C...[[id number]]  Normal              Final result               Extra Urine Gray Tube[[id number]]                            In process                   Please view results for these tests on the individual orders.   EXTRA URINE GRAY TUBE   C. TRACHOMATIS / N. GONORRHOEAE, AMPLIFIED, UROGENITAL   TRICH VAGINALIS, AMPLIFIED   URINALYSIS MICROSCOPIC WITH REFLEX CULTURE    WBC, Urine 1-5      RBC, Urine 1-2      Squamous Epithelial Cells, Urine 1-9 (SPARSE)      Mucus, Urine 2+         US PELVIS TRANSABDOMINAL WITH TRANSVAGINAL   Final Result   Trace amount of free fluid at the right adnexa. Otherwise, no acute   findings.                       I personally reviewed the images/study and I agree with the findings   as stated.        MACRO:   None        Signed by: Hazel Bullock 5/5/2025 8:57 PM   Dictation workstation:   [id number]      CT abdomen pelvis w IV contrast   Final Result   1. Mild wall thickening of multiple small bowel loops as well as   possible scattered colonic wall thickening, suggesting underlying   enterocolitis which may be infectious or inflammatory in etiology.   Additionally, multiple prominent caliber small bowel loops demonstrate   air-fluid levels and fecalization of intraluminal contents, raising   the possibility of a developing small bowel obstruction possibly due   to inflammatory stricture. Although, no discrete transition  zone is   identified.   Signed by Edwin Bradford DO        Final diagnoses:   [R10.31] Right lower quadrant abdominal pain         Tabitha Crooks PA-C